# Patient Record
Sex: MALE | Race: BLACK OR AFRICAN AMERICAN | NOT HISPANIC OR LATINO | Employment: UNEMPLOYED | URBAN - METROPOLITAN AREA
[De-identification: names, ages, dates, MRNs, and addresses within clinical notes are randomized per-mention and may not be internally consistent; named-entity substitution may affect disease eponyms.]

---

## 2017-04-21 DIAGNOSIS — Z20.2 CONTACT WITH AND (SUSPECTED) EXPOSURE TO INFECTIONS WITH A PREDOMINANTLY SEXUAL MODE OF TRANSMISSION: ICD-10-CM

## 2018-10-28 ENCOUNTER — HOSPITAL ENCOUNTER (EMERGENCY)
Facility: HOSPITAL | Age: 33
Discharge: HOME | End: 2018-10-29
Admitting: EMERGENCY MEDICINE
Payer: COMMERCIAL

## 2018-10-28 VITALS
DIASTOLIC BLOOD PRESSURE: 72 MMHG | SYSTOLIC BLOOD PRESSURE: 127 MMHG | WEIGHT: 155.6 LBS | HEART RATE: 68 BPM | OXYGEN SATURATION: 100 % | TEMPERATURE: 98.1 F | RESPIRATION RATE: 18 BRPM

## 2018-10-28 DIAGNOSIS — H57.11 ACUTE RIGHT EYE PAIN: Primary | ICD-10-CM

## 2018-10-28 DIAGNOSIS — H57.89 REDNESS OF RIGHT EYE: ICD-10-CM

## 2018-10-28 PROCEDURE — 99283 EMERGENCY DEPT VISIT LOW MDM: CPT

## 2018-10-28 RX ORDER — PROPARACAINE HYDROCHLORIDE 5 MG/ML
2 SOLUTION/ DROPS OPHTHALMIC ONCE
Status: COMPLETED | OUTPATIENT
Start: 2018-10-29 | End: 2018-10-29

## 2018-10-29 RX ORDER — POLYMYXIN B SULFATE AND TRIMETHOPRIM 1; 10000 MG/ML; [USP'U]/ML
1 SOLUTION OPHTHALMIC EVERY 6 HOURS
Qty: 10 ML | Refills: 0 | Status: SHIPPED | OUTPATIENT
Start: 2018-10-29 | End: 2018-11-05

## 2018-10-29 RX ADMIN — FLUORESCEIN SODIUM 1 STRIP: 0.6 STRIP OPHTHALMIC at 00:09

## 2018-10-29 RX ADMIN — PROPARACAINE HYDROCHLORIDE 2 DROP: 5 SOLUTION/ DROPS OPHTHALMIC at 00:09

## 2018-10-29 NOTE — DISCHARGE INSTRUCTIONS
Eye Pain   WHAT YOU NEED TO KNOW:   Eye pain may be caused by a problem within your eye  A problem or condition in another body area can also cause pain that travels to your eye  Some causes of eye pain include dry eyes, inflammation, a sinus infection, or a cluster headache  DISCHARGE INSTRUCTIONS:   Medicines: You may need any of the following:  · Artificial tears  are eyedrops that can help moisturize your eyes and relieve your pain  Ask your healthcare provider how often to use artificial tears  · NSAIDs , such as ibuprofen, help decrease swelling, pain, and fever  This medicine is available with or without a doctor's order  NSAIDs can cause stomach bleeding or kidney problems in certain people  If you take blood thinner medicine, always ask if NSAIDs are safe for you  Always read the medicine label and follow directions  Do not give these medicines to children under 10months of age without direction from your child's healthcare provider  · Take your medicine as directed  Contact your healthcare provider if you think your medicine is not helping or if you have side effects  Tell him of her if you are allergic to any medicine  Keep a list of the medicines, vitamins, and herbs you take  Include the amounts, and when and why you take them  Bring the list or the pill bottles to follow-up visits  Carry your medicine list with you in case of an emergency  Follow up with your healthcare provider as directed: You may be referred to an eye specialist  Write down your questions so you remember to ask them during your visits  Contact your healthcare provider if:   · You have a fever  · Your eye pain gets worse when you move your eyes  · You have questions or concerns about your condition or care  Return to the emergency department if:   · You have any vision loss  · You have sudden vision changes such as blurred vision, double vision, or seeing halos around lights       · You develop severe eye pain   © 2017 [facility] Information is for End User's use only and may not be sold, redistributed or otherwise used for commercial purposes  All illustrations and images included in CareNotes® are the copyrighted property of [employer]  or Lewis Bhandari  The above information is an  only  It is not intended as medical advice for individual conditions or treatments  Talk to your doctor, nurse or pharmacist before following any medical regimen to see if it is safe and effective for you  DISCHARGE INSTRUCTIONS:    FOLLOW UP WITH YOUR PRIMARY CARE PROVIDER OR THE [address] [place] [address]  MAKE AN APPOINTMENT TO BE SEEN  APPLY POLYTRIM TO THE RIGHT EYE AS PRESCRIBED  IF RASH, SHORTNESS OF BREATH OR TROUBLE SWALLOWING, STOP TAKING THE MEDICATION AND BE SEEN  FOLLOW UP WITH THE RECOMMENDED EYE SPECIALIST  IF SYMPTOMS WORSEN OR NEW SYMPTOMS ARISE, RETURN TO THE ER TO BE SEEN

## 2018-10-29 NOTE — ED PROVIDER NOTES
History  Chief Complaint   Patient presents with    Eye Problem     Right eye redness/irritation  Reports clear drainage  Denies injury or sensation of foreign body  Denies itching       32y  o male with no significant PMH presents to the ER for right eye pain and redness for 1 day  Patient states he used eye drops without relief  He is unable to describe his pain but states he also has been experiencing tearing  Symptoms are constant  He denies contact use or wearing glasses  He denies injury to the area  He denies fever, chills, URI symptoms, chest pain, dyspnea, N/V/D, abdominal pain, weakness or paresthesias  History provided by:  Patient   used: No        None       History reviewed  No pertinent past medical history  History reviewed  No pertinent surgical history  History reviewed  No pertinent family history  I have reviewed and agree with the history as documented  Social History   Substance Use Topics    Smoking status: Never Smoker    Smokeless tobacco: Never Used    Alcohol use No        Review of Systems   Constitutional: Negative for activity change, appetite change, chills and fever  HENT: Negative for congestion, drooling, ear discharge, ear pain, facial swelling, rhinorrhea and sore throat  Eyes: Positive for photophobia, pain, discharge and redness  Negative for itching and visual disturbance  Respiratory: Negative for shortness of breath  Cardiovascular: Negative for chest pain  Gastrointestinal: Negative for abdominal pain, diarrhea, nausea and vomiting  Musculoskeletal: Negative for neck stiffness  Skin: Negative for rash  Allergic/Immunologic: Negative for food allergies  Neurological: Negative for weakness and numbness  Physical Exam  Physical Exam   Constitutional:  Non-toxic appearance  No distress  HENT:   Head: Normocephalic and atraumatic     Right Ear: Tympanic membrane, external ear and ear canal normal  No drainage, swelling or tenderness  No foreign bodies  Tympanic membrane is not erythematous  No hemotympanum  Left Ear: Tympanic membrane, external ear and ear canal normal  No drainage, swelling or tenderness  No foreign bodies  Tympanic membrane is not erythematous  No hemotympanum  Nose: Nose normal    Mouth/Throat: Uvula is midline, oropharynx is clear and moist and mucous membranes are normal  No uvula swelling  No posterior oropharyngeal edema, posterior oropharyngeal erythema or tonsillar abscesses  No tonsillar exudate  Eyes: Pupils are equal, round, and reactive to light  EOM and lids are normal  Right conjunctiva is injected  Right conjunctiva has no hemorrhage  Left conjunctiva is not injected  Left conjunctiva has no hemorrhage  Slit lamp exam:       The right eye shows no corneal abrasion, no corneal flare, no corneal ulcer, no foreign body, no hyphema, no hypopyon and no fluorescein uptake  IOP were 19, 18 and 19  Neck: Normal range of motion and phonation normal  Neck supple  No tracheal deviation present  Cardiovascular: Normal rate, regular rhythm, S1 normal, S2 normal and normal heart sounds  Exam reveals no gallop and no friction rub  No murmur heard  Pulmonary/Chest: Effort normal and breath sounds normal  No respiratory distress  He has no decreased breath sounds  He has no wheezes  He has no rhonchi  He has no rales  He exhibits no tenderness  Neurological: He is alert  GCS eye subscore is 4  GCS verbal subscore is 5  GCS motor subscore is 6  Skin: Skin is warm and dry  No rash noted  Psychiatric: He has a normal mood and affect  Nursing note and vitals reviewed        Vital Signs  ED Triage Vitals [10/28/18 2328]   Temperature Pulse Respirations Blood Pressure SpO2   98 1 °F (36 7 °C) 68 18 127/72 100 %      Temp src Heart Rate Source Patient Position - Orthostatic VS BP Location FiO2 (%)   -- Monitor Sitting Right arm --      Pain Score       4           Vitals:    10/28/18 2328   BP: 127/72   Pulse: 68   Patient Position - Orthostatic VS: Sitting       Visual Acuity  Visual Acuity      Most Recent Value   Visual acuity R eye is  20/70   Visual acuity Left eye is  20/70   Visual acuity in both eyes is  20/50   Wearing corrective eyewear/lenses? No          ED Medications  Medications   fluorescein sodium sterile ophthalmic strip 1 strip (1 strip Right Eye Given by Other 10/29/18 0009)   proparacaine (ALCAINE) 0 5 % ophthalmic solution 2 drop (2 drops Right Eye Given by Other 10/29/18 0009)       Diagnostic Studies  Results Reviewed     None                 No orders to display              Procedures  Procedures       Phone Contacts  ED Phone Contact    ED Course                               MDM  Number of Diagnoses or Management Options  Acute right eye pain: new and does not require workup  Redness of right eye: new and does not require workup  Diagnosis management comments: DDX consists of but not limited to: conjunctivitis, corneal abrasion, uveitis, foreign body    Will check visual acuity, woods lamp and IOP  At discharge, I instructed the patient to:  -follow up with pcp  -apply Polytrim to the eye as prescribed  -follow up with the recommended eye specialist  -return to the ER if symptoms worsened or new symptoms arose  Patient agreed to this plan and was stable at time of discharge  Patient Progress  Patient progress: stable    CritCare Time    Disposition  Final diagnoses:   Acute right eye pain   Redness of right eye     Time reflects when diagnosis was documented in both MDM as applicable and the Disposition within this note     Time User Action Codes Description Comment    10/29/2018 12:34 AM Afshan Lei [[id number]] Acute right eye pain     10/29/2018 12:35 AM Afshan Lei [[id number]] Redness of right eye       ED Disposition     ED Disposition Condition Comment    Discharge  [address] discharge to home/self care      Condition at discharge: Stable        Follow-up Information     Follow up With Specialties Details Why Contact Info    Rasheed iJ MD Internal Medicine Schedule an appointment as soon as possible for a visit As needed [facility]      Carleene Phalen, MD Ophthalmology Schedule an appointment as soon as possible for a visit in 1 day  [address]  [phone]            Patient's Medications   Discharge Prescriptions    POLYMYXIN B-TRIMETHOPRIM (POLYTRIM) OPHTHALMIC SOLUTION    Administer 1 drop to the right eye every 6 (six) hours for 7 days       Start Date: 10/29/2018End Date: 11/5/2018       Order Dose: 1 drop       Quantity: 10 mL    Refills: 0     No discharge procedures on file      ED Provider  Electronically Signed by           Micah Cantu PA-C  10/29/18 6303

## 2024-05-27 ENCOUNTER — OCCMED (OUTPATIENT)
Dept: URGENT CARE | Facility: MEDICAL CENTER | Age: 39
End: 2024-05-27
Payer: OTHER MISCELLANEOUS

## 2024-05-27 ENCOUNTER — APPOINTMENT (OUTPATIENT)
Dept: RADIOLOGY | Facility: MEDICAL CENTER | Age: 39
End: 2024-05-27
Payer: OTHER MISCELLANEOUS

## 2024-05-27 DIAGNOSIS — S69.91XA HAND INJURY, RIGHT, INITIAL ENCOUNTER: ICD-10-CM

## 2024-05-27 DIAGNOSIS — S69.91XA HAND INJURY, RIGHT, INITIAL ENCOUNTER: Primary | ICD-10-CM

## 2024-05-27 PROCEDURE — G0382 LEV 3 HOSP TYPE B ED VISIT: HCPCS | Performed by: FAMILY MEDICINE

## 2024-05-27 PROCEDURE — 99283 EMERGENCY DEPT VISIT LOW MDM: CPT | Performed by: FAMILY MEDICINE

## 2024-05-27 PROCEDURE — 73130 X-RAY EXAM OF HAND: CPT
